# Patient Record
Sex: MALE | URBAN - METROPOLITAN AREA
[De-identification: names, ages, dates, MRNs, and addresses within clinical notes are randomized per-mention and may not be internally consistent; named-entity substitution may affect disease eponyms.]

---

## 2017-01-20 ENCOUNTER — OFFICE VISIT (OUTPATIENT)
Dept: OPTOMETRY | Facility: CLINIC | Age: 67
End: 2017-01-20
Payer: COMMERCIAL

## 2017-01-20 DIAGNOSIS — H02.889 MEIBOMIAN GLAND DYSFUNCTION: ICD-10-CM

## 2017-01-20 DIAGNOSIS — H52.4 PRESBYOPIA: ICD-10-CM

## 2017-01-20 DIAGNOSIS — H52.13 MYOPIA, BILATERAL: Primary | ICD-10-CM

## 2017-01-20 DIAGNOSIS — H26.9 CATARACTS, BOTH EYES: ICD-10-CM

## 2017-01-20 DIAGNOSIS — H52.203 ASTIGMATISM, BILATERAL: ICD-10-CM

## 2017-01-20 PROCEDURE — 92004 COMPRE OPH EXAM NEW PT 1/>: CPT | Performed by: OPTOMETRIST

## 2017-01-20 PROCEDURE — 92015 DETERMINE REFRACTIVE STATE: CPT | Performed by: OPTOMETRIST

## 2017-01-20 RX ORDER — DIPHENOXYLATE HYDROCHLORIDE AND ATROPINE SULFATE 2.5; .025 MG/1; MG/1
TABLET ORAL
COMMUNITY
Start: 2005-09-06

## 2017-01-20 RX ORDER — SIMVASTATIN 40 MG
40 TABLET ORAL
COMMUNITY
Start: 2015-05-29

## 2017-01-20 RX ORDER — ASPIRIN 81 MG/1
81 TABLET ORAL
COMMUNITY
Start: 2015-05-29

## 2017-01-20 ASSESSMENT — EXTERNAL EXAM - RIGHT EYE: OD_EXAM: NORMAL

## 2017-01-20 ASSESSMENT — REFRACTION_WEARINGRX
OD_SPHERE: -2.25
OD_ADD: +1.75
OS_AXIS: 087
OS_SPHERE: -3.00
OS_CYLINDER: +2.25
SPECS_TYPE: AUTO/PAL
OS_ADD: +1.75
OD_CYLINDER: +1.75
OD_AXIS: 093
OS_AXIS: 084
SPECS_TYPE: AUTO/PAL
OS_SPHERE: -3.00
OS_CYLINDER: +2.25
OD_SPHERE: -2.75
OD_CYLINDER: +1.25
OD_AXIS: 096

## 2017-01-20 ASSESSMENT — VISUAL ACUITY
OS_CC: 20/20
OD_CC: 20/20
OS_CC: 20/20-3
OD_SC: 20/100
OD_CC: 20/20
METHOD: SNELLEN - LINEAR
OS_CC+: -1
OD_SC+: -1
OS_SC: 20/50
OS_SC+: -1
OD_CC+: -1
CORRECTION_TYPE: GLASSES

## 2017-01-20 ASSESSMENT — EXTERNAL EXAM - LEFT EYE: OS_EXAM: NORMAL

## 2017-01-20 ASSESSMENT — REFRACTION_MANIFEST
OS_CYLINDER: +2.00
OD_ADD: +2.50
OS_ADD: +2.50
OS_SPHERE: -3.00
OS_AXIS: 086
OD_SPHERE: -2.25
OD_CYLINDER: +1.00
OD_AXIS: 096

## 2017-01-20 ASSESSMENT — CONF VISUAL FIELD
OD_NORMAL: 1
OS_NORMAL: 1
METHOD: COUNTING FINGERS

## 2017-01-20 ASSESSMENT — TONOMETRY
OS_IOP_MMHG: 15
OD_IOP_MMHG: 14
IOP_METHOD: TONOPEN

## 2017-01-20 ASSESSMENT — CUP TO DISC RATIO
OS_RATIO: 0.2
OD_RATIO: 0.2

## 2017-01-20 ASSESSMENT — SLIT LAMP EXAM - LIDS
COMMENTS: UPPER LID DERMATOCHALASIS, MEIBOMIAN GLAND DYSFUNCTION
COMMENTS: UPPER LID DERMATOCHALASIS, MEIBOMIAN GLAND DYSFUNCTION

## 2017-01-20 NOTE — PATIENT INSTRUCTIONS
Eyeglass prescription given.    Warm packs nightly.  Lid scrubs nightly with Ocusoft of Systane pads or with diluted baby shampoo.  Artificial tears 3-4 x daily (Systane Balance)    You have the start of mild cataracts.  You may notice some blurred vision or glare with night driving.  It is important that you wear good sunglasses to protect your eyes from the ultraviolet light from the sun.  I recommend that you return in 1 year for an eye exam unless there are any sudden changes in your vision.    Return in 1 year for a complete eye exam or sooner if needed.    Lc Mccall, JORGE    The affects of the dilating drops last for 4- 6 hours.  You will be more sensitive to light and vision will be blurry up close.  Mydriatic sunglasses were given if needed.      Optometry Providers       Clinic Locations                                 Telephone Number   Dr. Radha Mccall   [facility]  [phone]     [place] [facility] Hours:                [facility] Hours:       [facility] Hours:  [address]   [address]      [address] [place] [address]   [place] [address]    [place] [address]  Phone: [phone]                    Phone [phone]                      Phone: [phone]                          Monday 8:00-7:00                          Monday 8:00-7:00                          Monday 8:00-7:00              Tuesday 8:00-6:00                          Tuesday 8:00-7:00                          Tuesday 8:00-7:00              Wednesday 8:00-6:00                  Wednesday 8:00-7:00                   Wednesday 8:00-7:00      Thursday 8:00-6:00                        Thursday 8:00-7:00                         Thursday 8:00-7:00            Friday 8:00-5:00                              Friday 8:00-5:00                              Friday 8:00-5:00    Please log on to  [email or web address] to order your contact lenses.  The link is found on the [facility] page.  As always, Thank you for trusting us with your health care needs!

## 2017-01-20 NOTE — PROGRESS NOTES
Chief Complaint   Patient presents with     COMPREHENSIVE EYE EXAM         Last Eye Exam: 4-5 years  Dilated Previously: Yes    What are you currently using to see?  glasses       Distance Vision Acuity: Satisfied with vision    Near Vision Acuity: Satisfied with vision while reading  with glasses    Eye Comfort: dry and bloodshot   Do you use eye drops? : No  Occupation or Hobbies: office    Likes rx from 10 years ago better than the one that is 4-5 years old    Allison Deras Optometric Assistant, A.B.O.C.         Medical, surgical and family histories reviewed and updated 1/20/2017.       OBJECTIVE: See Ophthalmology exam    ASSESSMENT:    ICD-10-CM    1. Myopia, bilateral H52.13 REFRACTION   2. Astigmatism, bilateral H52.203 REFRACTION   3. Presbyopia H52.4 REFRACTION   4. Meibomian gland dysfunction H02.89 EYE EXAM (SIMPLE-NONBILLABLE)   5. Cataracts, both eyes H26.9 EYE EXAM (SIMPLE-NONBILLABLE)      PLAN:     Patient Instructions     Eyeglass prescription given.    Warm packs nightly.  Lid scrubs nightly with Ocusoft of Systane pads or with diluted baby shampoo.  Artificial tears 3-4 x daily (Systane Balance)    You have the start of mild cataracts.  You may notice some blurred vision or glare with night driving.  It is important that you wear good sunglasses to protect your eyes from the ultraviolet light from the sun.  I recommend that you return in 1 year for an eye exam unless there are any sudden changes in your vision.    Return in 1 year for a complete eye exam or sooner if needed.    Lc Mccall, OD

## 2017-01-20 NOTE — MR AVS SNAPSHOT
After Visit Summary   1/20/2017    Edwin Francisco    MRN: [id number]           Patient Information     Date Of Birth          1950        Visit Information        Provider Department      1/20/2017 8:30 AM Lc Mccall, JORGE [facility]        Today's Diagnoses     Myopia, bilateral    -  1     Astigmatism, bilateral         Presbyopia         Meibomian gland dysfunction         Cataracts, both eyes           Care Instructions      Eyeglass prescription given.    Warm packs nightly.  Lid scrubs nightly with Ocusoft of Systane pads or with diluted baby shampoo.  Artificial tears 3-4 x daily (Systane Balance)    You have the start of mild cataracts.  You may notice some blurred vision or glare with night driving.  It is important that you wear good sunglasses to protect your eyes from the ultraviolet light from the sun.  I recommend that you return in 1 year for an eye exam unless there are any sudden changes in your vision.    Return in 1 year for a complete eye exam or sooner if needed.    Lc Mccall, JORGE    The affects of the dilating drops last for 4- 6 hours.  You will be more sensitive to light and vision will be blurry up close.  Mydriatic sunglasses were given if needed.      Optometry Providers       Clinic Locations                                 Telephone Number   Dr. Radha Mccall   [facility]  [phone]     [place] [facility] Hours:                [facility] Hours:       [facility] Hours:  [address]   [address]     [address] [place] [address]   [place] [address]    [place] [address]  Phone: [phone]                    Phone [phone]                      Phone: [phone]                          Monday 8:00-7:00                          Monday 8:00-7:00                          Monday 8:00-7:00               Tuesday 8:00-6:00                          Tuesday 8:00-7:00                          Tuesday 8:00-7:00              Wednesday 8:00-6:00                  Wednesday 8:00-7:00                   Wednesday 8:00-7:00      Thursday 8:00-6:00                        Thursday 8:00-7:00                         Thursday 8:00-7:00            Friday 8:00-5:00                              Friday 8:00-5:00                              Friday 8:00-5:00    Please log on to [email or web address] to order your contact lenses.  The link is found on the [facility] Care [facility] Vision Services page.  As always, Thank you for trusting us with your health care needs!          Follow-ups after your visit        Who to contact     If you have questions or need follow up information about today's clinic visit or your schedule please contact [facility] directly at [phone].  Normal or non-critical lab and imaging results will be communicated to you by MyChart, letter or phone within 4 business days after the clinic has received the results. If you do not hear from us within 7 days, please contact the clinic through [employer] or phone. If you have a critical or abnormal lab result, we will notify you by phone as soon as possible.  Submit refill requests through [employer] or call your pharmacy and they will forward the refill request to us. Please allow 3 business days for your refill to be completed.          Additional Information About Your Visit        [employer] Information     [employer] is an electronic gateway that provides easy, online access to your medical records. With [employer], you can request a clinic appointment, read your test results, renew a prescription or communicate with your care team.     To sign up for [employer] visit the website at [email or web address]   You will be asked to enter the access code listed below, as well as some personal information. Please follow the directions to create your username and password.     Your  access code is: [email or web address]  Expires: 2017  9:19 AM     Your access code will  in 90 days. If you need help or a new code, please contact your [facility] or call [phone] for assistance.        Care EveryWhere ID     This is your Care EveryWhere ID. This could be used by other organizations to access your [place] medical records  [id number]         Blood Pressure from Last 3 Encounters:   No data found for BP    Weight from Last 3 Encounters:   No data found for Wt              We Performed the Following     EYE EXAM (SIMPLE-NONBILLABLE)     REFRACTION        Primary Care Provider    [facility]       No address on file        Thank you!     Thank you for choosing [facility]  for your care. Our goal is always to provide you with excellent care. Hearing back from our patients is one way we can continue to improve our services. Please take a few minutes to complete the written survey that you may receive in the mail after your visit with us. Thank you!             Your Updated Medication List - Protect others around you: Learn how to safely use, store and throw away your medicines at www.disposemymeds.org.          This list is accurate as of: 17  9:19 AM.  Always use your most recent med list.                   Brand Name Dispense Instructions for use    aspirin EC 81 MG EC tablet      Take 81 mg by mouth       DHA-EPA-VITAMIN E PO      Take 2,000 capsules by mouth       MULTI-VITAMINS Tabs          simvastatin 40 MG tablet    ZOCOR     Take 40 mg by mouth